# Patient Record
Sex: MALE | Race: BLACK OR AFRICAN AMERICAN | NOT HISPANIC OR LATINO | URBAN - METROPOLITAN AREA
[De-identification: names, ages, dates, MRNs, and addresses within clinical notes are randomized per-mention and may not be internally consistent; named-entity substitution may affect disease eponyms.]

---

## 2021-08-24 ENCOUNTER — OFFICE VISIT (OUTPATIENT)
Dept: URBAN - METROPOLITAN AREA CLINIC 40 | Facility: CLINIC | Age: 71
End: 2021-08-24
Payer: MEDICARE

## 2021-08-24 VITALS
TEMPERATURE: 96.4 F | BODY MASS INDEX: 24.99 KG/M2 | WEIGHT: 150 LBS | SYSTOLIC BLOOD PRESSURE: 128 MMHG | DIASTOLIC BLOOD PRESSURE: 74 MMHG | HEART RATE: 56 BPM | HEIGHT: 65 IN

## 2021-08-24 DIAGNOSIS — R76.8 HELICOBACTER PYLORI ANTIBODY POSITIVE: ICD-10-CM

## 2021-08-24 DIAGNOSIS — Z86.010 PERSONAL HISTORY OF COLON POLYPS: ICD-10-CM

## 2021-08-24 DIAGNOSIS — R93.5 ABNORMAL ABDOMINAL CT SCAN: ICD-10-CM

## 2021-08-24 DIAGNOSIS — K76.0 FATTY LIVER: ICD-10-CM

## 2021-08-24 PROCEDURE — 99203 OFFICE O/P NEW LOW 30 MIN: CPT | Performed by: PHYSICIAN ASSISTANT

## 2021-08-24 RX ORDER — ATORVASTATIN CALCIUM 80 MG/1
1 TABLET TABLET, FILM COATED ORAL ONCE A DAY
Status: ACTIVE | COMMUNITY

## 2021-08-24 RX ORDER — PANTOPRAZOLE SODIUM 40 MG/1
1 TABLET TABLET, DELAYED RELEASE ORAL ONCE A DAY
Status: ACTIVE | COMMUNITY

## 2021-08-24 NOTE — HPI-TODAY'S VISIT:
Mr. Galicia is a 71 year old [facility]-American black male who presents to office for follow up of abnormal abdominal imaging. CT A/P 7/21 noting diffuse fatty liver, small amount of abdominal, perihepatic ascites. No focal lesions of liver. No cirrhotic changes noted. Apparent filling defects within the IVC, left renal vein as well as the SMV and portal vein is favored to represent mixing artifact rather than thrombosis. Portal vein otherwise appears patent. No acute findings within the stomach. Thick-walled appearance to the stomach likely related to underdistention or could be reactive due to subjacent ascites. His recent LFTs normal. Normal pancreas. No gallstones noted. History of PrCA, CAD. Now off Brilinta. On statin therapy. History of CV stent placement remotely. Reportedly normal EGD in the last three years. Small polyp removed during a colonoscopy in 2018. Denies GERD symptoms, on pantoprazole daily. +Hpylori IgA, negative IgG. No prior treatment for Hpylori. Good appetite without abdominal pain. Denies personal or family history of liver disease.

## 2021-08-27 ENCOUNTER — TELEPHONE ENCOUNTER (OUTPATIENT)
Dept: URBAN - METROPOLITAN AREA CLINIC 40 | Facility: CLINIC | Age: 71
End: 2021-08-27

## 2021-09-10 ENCOUNTER — OFFICE VISIT (OUTPATIENT)
Dept: URBAN - METROPOLITAN AREA CLINIC 39 | Facility: CLINIC | Age: 71
End: 2021-09-10

## 2021-10-05 ENCOUNTER — DASHBOARD ENCOUNTERS (OUTPATIENT)
Age: 71
End: 2021-10-05

## 2021-10-05 ENCOUNTER — OFFICE VISIT (OUTPATIENT)
Dept: URBAN - METROPOLITAN AREA CLINIC 40 | Facility: CLINIC | Age: 71
End: 2021-10-05
Payer: MEDICARE

## 2021-10-05 VITALS
SYSTOLIC BLOOD PRESSURE: 138 MMHG | HEIGHT: 65 IN | BODY MASS INDEX: 24.83 KG/M2 | WEIGHT: 149 LBS | DIASTOLIC BLOOD PRESSURE: 70 MMHG | TEMPERATURE: 97.7 F | HEART RATE: 54 BPM

## 2021-10-05 DIAGNOSIS — R76.8 HELICOBACTER PYLORI ANTIBODY POSITIVE: ICD-10-CM

## 2021-10-05 DIAGNOSIS — K76.0 FATTY LIVER: ICD-10-CM

## 2021-10-05 DIAGNOSIS — Z87.19 HISTORY OF GI BLEED: ICD-10-CM

## 2021-10-05 DIAGNOSIS — Z86.010 PERSONAL HISTORY OF COLON POLYPS: ICD-10-CM

## 2021-10-05 DIAGNOSIS — R93.5 ABNORMAL ABDOMINAL CT SCAN: ICD-10-CM

## 2021-10-05 PROBLEM — 428283002 HISTORY OF POLYP OF COLON: Status: ACTIVE | Noted: 2021-08-24

## 2021-10-05 PROBLEM — 197321007 FATTY LIVER: Status: ACTIVE | Noted: 2021-08-24

## 2021-10-05 PROCEDURE — 99213 OFFICE O/P EST LOW 20 MIN: CPT | Performed by: PHYSICIAN ASSISTANT

## 2021-10-05 RX ORDER — PANTOPRAZOLE SODIUM 40 MG/1
1 TABLET TABLET, DELAYED RELEASE ORAL ONCE A DAY
Status: ACTIVE | COMMUNITY

## 2021-10-05 RX ORDER — SODIUM, POTASSIUM,MAG SULFATES 17.5-3.13G
ONCE SOLUTION, RECONSTITUTED, ORAL ORAL
Qty: 1 KIT | Refills: 0 | OUTPATIENT
Start: 2021-10-05 | End: 2021-10-06

## 2021-10-05 RX ORDER — ATORVASTATIN CALCIUM 80 MG/1
1 TABLET TABLET, FILM COATED ORAL ONCE A DAY
Status: ACTIVE | COMMUNITY

## 2021-10-05 NOTE — HPI-TODAY'S VISIT:
Mr. Galicia is a 71 year old [facility]-American black male last seen 8/24/21 who presents to office for follow up of abnormal abdominal imaging and abnormal labs. A CT A/P 7/21 noted diffuse fatty liver, small amount of abdominal, perihepatic ascites. No focal lesions of liver. No cirrhotic changes noted. Apparent filling defects within the IVC, left renal vein as well as the SMV and portal vein is favored to represent mixing artifact rather than thrombosis. Portal vein otherwise appears patent. No acute findings within the stomach. Thick-walled appearance to the stomach likely related to underdistention or could be reactive due to subjacent ascites. His recent LFTs normal. Normal pancreas. No gallstones noted. History of PrCA, CAD. Now off Brilinta. On statin therapy. History of CV stent placement remotely. Reportedly normal EGD in the last three years. Small polyp removed during a colonoscopy in 2018. Denies GERD symptoms, on pantoprazole daily. +Hpylori IgA, negative IgG. No prior treatment for Hpylori. Good appetite without abdominal pain. Denies personal or family history of liver disease. Mr. Galicia did complete a right upper quadrant ultrasound September 7, 2021 where he was noted to have evidence of diffuse fatty liver.  Coarsened echogenicity.  Normal gallbladder.  Normal common bile duct at top normal diameter 8 mm.  No ascites identified.  I do see where he had an EGD in 2017 by Dr. Valencia Duong for evaluation of hematemesis and abdominal pain.  During the procedure a large amount of blood clot was in the stomach.  This inhibited diffuse and the EGD was aborted.  EGD was repeated two days later,  during this hospital admission, where he was noted to have severe, ulcerative esophagitis with adherent clot at the distal esophagus but no bleeding seen.  Suspected severe esophagitis versus Quiana-Bradford tear.  Focal gastritis.  Otherwise, the stomach appeared normal.  Nonbleeding duodenal diverticulum in the second portion of the duodenum.  His last colonoscopy was January 4, 2019 by Dr. Flavio Napier where he had findings of multiple nonbleeding diverticula in the descending and sigmoid colon.  There was luminal narrowing.  The scope could not be advanced beyond 50 cm.  A single sessile polyp of 20 mm found in the distal rectum.  This was a carpet-like  polyp and this was biopsied.  Due to the stenosis in the descending colon, the procedure was interrupted the visualization.  Per pathology, the polyp was consistent with a tubular adenoma without high-grade dysplasia. It appears he had electrocautery/hemostasis on 5/7/19 by Dr. Hanson of General surgery to control post-polypectomy bleed. No colonosocpy since that time. Recent Hpylori breath test negative, off ppi therapy at time of testing. No GI complaints today.

## 2021-10-22 ENCOUNTER — TELEPHONE ENCOUNTER (OUTPATIENT)
Dept: URBAN - METROPOLITAN AREA CLINIC 40 | Facility: CLINIC | Age: 71
End: 2021-10-22

## 2025-05-07 NOTE — PHYSICAL EXAM GASTROINTESTINAL
Continued Stay SW/CM Assessment/Plan of Care Note       Active Substitute Decision Maker (SDM)    There are no active Substitute Decision Maker (SDM) on file.           Progress note:  Pt discussed at rounds. Pt had recent stem cell transplant at [facility] and dc last week, Per RN, pt is indep in room and no dc needs anticipated    See SW/CM flowsheets for other objective data.    Disposition Recommendations:  Preliminary discharge destination: Planned Discharge Destination: Home self-care  SW/CM recommendation for discharge: Home      Continued Care and Services - Admitted Since 5/6/2025    No active coordination exists for this encounter.             Prior To Hospitalization:    Living Situation: Spouse, Children and residing at House    .  Support Systems: Children, Family members   Home Devices/Equipment: None            Mobility Assist Devices: None   Type of Service Prior to Hospitalization: Nurse (specify) (home health nurse)               Patient/Family discharge goal (s):  Home       Barriers to Discharge  Identified Barriers to Discharge/Transition Planning: Medical necessity for acute care (neutropenia, r/o c-diff)                   Abdomen , soft, nontender, nondistended , no guarding or rigidity , no masses palpable , normal bowel sounds , Liver and Spleen , no hepatomegaly present , no hepatosplenomegaly , liver nontender , spleen not palpable